# Patient Record
Sex: MALE | Race: WHITE
[De-identification: names, ages, dates, MRNs, and addresses within clinical notes are randomized per-mention and may not be internally consistent; named-entity substitution may affect disease eponyms.]

---

## 2019-03-14 LAB
ALBUMIN SERPL-MCNC: 3.5 G/DL (ref 3.4–5)
ALP SERPL-CCNC: 65 U/L (ref 46–116)
ALT SERPL-CCNC: 20 U/L (ref 30–65)
ANION GAP SERPL CALC-SCNC: 4 MMOL/L (ref 7–16)
AST SERPL-CCNC: 19 U/L (ref 15–37)
BILIRUB SERPL-MCNC: 0.4 MG/DL
BILIRUB UR-MCNC: NEGATIVE MG/DL
BUN SERPL-MCNC: 16 MG/DL (ref 7–18)
CALCIUM SERPL-MCNC: 8.9 MG/DL (ref 8.5–10.1)
CHLORIDE SERPL-SCNC: 107 MMOL/L (ref 98–107)
CO2 SERPL-SCNC: 31 MMOL/L (ref 21–32)
COLOR UR: YELLOW
CREAT SERPL-MCNC: 1.1 MG/DL (ref 0.6–1.3)
GLUCOSE SERPL-MCNC: 92 MG/DL (ref 70–99)
HCT VFR BLD CALC: 38.9 % (ref 42–52)
HGB BLD-MCNC: 13.1 GM/DL (ref 14–18)
INR PPP: 1.1
KETONES UR STRIP-MCNC: NEGATIVE MG/DL
MCH RBC QN AUTO: 30.9 PG (ref 26–34)
MCHC RBC AUTO-ENTMCNC: 33.8 G/DL (ref 28–37)
MCV RBC: 91.6 FL (ref 80–100)
MPV: 8.9 FL. (ref 7.2–11.1)
PLATELET COUNT*: 157 THOU/UL (ref 150–400)
POTASSIUM SERPL-SCNC: 4.2 MMOL/L (ref 3.5–5.1)
PROT SERPL-MCNC: 6.8 G/DL (ref 6.4–8.2)
PROT UR QL STRIP: NEGATIVE
PROTHROMBIN TIME: 10.8 SECONDS (ref 9.2–11.5)
RBC # BLD AUTO: 4.24 MIL/UL (ref 4.5–6)
RBC # UR STRIP: NEGATIVE /UL
RDW-CV: 14.3 % (ref 10.5–14.5)
SODIUM SERPL-SCNC: 142 MMOL/L (ref 136–145)
SP GR UR STRIP: 1.02 (ref 1–1.03)
URINE CLARITY: CLEAR
URINE GLUCOSE-RANDOM: NEGATIVE
URINE LEUKOCYTES-REFLEX: NEGATIVE
URINE NITRITE-REFLEX: NEGATIVE
UROBILINOGEN UR STRIP-ACNC: 0.2 E.U./DL (ref 0.2–1)
WBC # BLD AUTO: 4.6 THOU/UL (ref 4–11)

## 2019-03-14 NOTE — EKG
[place] [address]
Phone:  [phone]                     ELECTROCARDIOGRAM REPORT      
_______________________________________________________________________________
 
Name:       COLMENARESAUGUSTAANTELMO SHINE             Room:                      PRE IN  
[facility].#:  [id number]      Account #:      [id number]  
Admission:               Attend Phys:    PABLO Resendiz
Discharge:               Date of Birth:  [birth year]  
         Report #: [id number]
    [id number]
_______________________________________________________________________________
THIS REPORT FOR:  //name//                      
 
                          [facility]
                                       
Test Date:    2019               Test Time:    09:20:46
Pat Name:     AUGUSTA COLMENARES            Department:   
Patient ID:   [id number]            Room:          
Gender:       M                        Technician:   
:          1945               Requested By: Brooks Fuentes
Order Number: [id number]    Milo MD:   Catrachito Bello
                                 Measurements
Intervals                              Axis          
Rate:         79                       P:            -69
MS:           280                      QRS:          7
QRSD:         88                       T:            -16
QT:           402                                    
QTc:          461                                    
                           Interpretive Statements
Sinus or ectopic atrial rhythm
Prolonged MS interval
Low voltage, precordial leads
Anteroseptal infarct, old, possible
Borderline T abnormalities, inferior leads
Baseline wander in lead(s) V1
No previous ECG available for comparison
 
Electronically Signed On 3- 13:45:45 CDT by Catrachito Bello
[email or web address]
 
 
 
 
 
 
 
 
 
 
 
 
 
 
 
  <ELECTRONICALLY SIGNED>
                                           By: Catrachito Bello MD, FAC   
  19     1345
D: 19 0920   _____________________________________
T: 1920   Catrachito Bello MD, [facility]     /EPI

## 2019-03-26 ENCOUNTER — HOSPITAL ENCOUNTER (INPATIENT)
Dept: HOSPITAL 96 - M.TBA | Age: 74
LOS: 2 days | Discharge: HOME HEALTH | DRG: 470 | End: 2019-03-28
Attending: INTERNAL MEDICINE | Admitting: INTERNAL MEDICINE
Payer: MEDICARE

## 2019-03-26 VITALS — SYSTOLIC BLOOD PRESSURE: 113 MMHG | DIASTOLIC BLOOD PRESSURE: 73 MMHG

## 2019-03-26 VITALS — HEIGHT: 70 IN | BODY MASS INDEX: 31.5 KG/M2 | WEIGHT: 220 LBS

## 2019-03-26 DIAGNOSIS — I48.91: ICD-10-CM

## 2019-03-26 DIAGNOSIS — D62: ICD-10-CM

## 2019-03-26 DIAGNOSIS — Z88.8: ICD-10-CM

## 2019-03-26 DIAGNOSIS — I25.10: ICD-10-CM

## 2019-03-26 DIAGNOSIS — I10: ICD-10-CM

## 2019-03-26 DIAGNOSIS — M17.11: Primary | ICD-10-CM

## 2019-03-26 DIAGNOSIS — Z79.899: ICD-10-CM

## 2019-03-26 DIAGNOSIS — Z95.1: ICD-10-CM

## 2019-03-26 PROCEDURE — 0SRC0J9 REPLACEMENT OF RIGHT KNEE JOINT WITH SYNTHETIC SUBSTITUTE, CEMENTED, OPEN APPROACH: ICD-10-PCS | Performed by: ORTHOPAEDIC SURGERY

## 2019-03-26 NOTE — NUR
PT ARRIVED ON UNIT ABOUT 1530 FROM PACU. VITALS STABLE. DRESSING IS CLEAN DRY
AND INTACT. DID CPM FOR 2 HOURS -5 TO 75. POLARPACK, DRAIN, SCDs AND TEDs IN
PLACE. IV IN L HAND PATENT, INFUSING. ON CAPNO. DENIED PAIN. DENIED NAUSEA.
CALL LIGHT WITHIN REACH. FALL PRECAUTIONS IN PLACE.

## 2019-03-27 VITALS — SYSTOLIC BLOOD PRESSURE: 110 MMHG | DIASTOLIC BLOOD PRESSURE: 69 MMHG

## 2019-03-27 VITALS — DIASTOLIC BLOOD PRESSURE: 50 MMHG | SYSTOLIC BLOOD PRESSURE: 100 MMHG

## 2019-03-27 VITALS — SYSTOLIC BLOOD PRESSURE: 107 MMHG | DIASTOLIC BLOOD PRESSURE: 57 MMHG

## 2019-03-27 VITALS — SYSTOLIC BLOOD PRESSURE: 107 MMHG | DIASTOLIC BLOOD PRESSURE: 59 MMHG

## 2019-03-27 LAB
HCT VFR BLD CALC: 33.4 % (ref 42–52)
HGB BLD-MCNC: 11.2 GM/DL (ref 14–18)

## 2019-03-27 NOTE — NUR
ASSUMED CARE OF PATIENT AT APPROX 0730. ALERT AND ORIENTED X4. ASSESSMENT
COMPLETED AND AS CHARTED. VSS ON ROOM. NO COMPLAINTS OF SOA. PAIN AND NAUSEA
HAVE BEEN MANAGED WITH MEDICATIONS. HYDROCODOEN CAUSES NAUSEA, SO ONLY BEEN
GIVING OXY IR THIS AFTERNOON WITH NO FURTHER COMPLAINTS OF NAUSEA. PATIENT UP
TO CHAIR THIS MORNING, WORKED WELL WITH THERAPIES AND PROGRESSED TOWARD
GOALS. CPM IN USE AS ORDERD. FALL PRECAUTIONS IN PLACE. CALL LIGHT WITHIN
REACH. HOURLY ROUNDS COMPLETED. NURSING WILL CONTINUE TO MONITOR.

## 2019-03-27 NOTE — NUR
PT REMAINED A&O X 4. VITALS, SpO2 STABLE. DRESSING CLEAN, DRY, AND INTACT.
HEMO VAC IN PLACE WITH SANGUINEOUS DRAINAGE. PT VOIDED ADEQUATELY. PAIN
CONTROLLED BY MEDS. PT WELL TOLERATED CPM. NO NAUSEA OR VOMITING. IV
ANTIBIOTIC GIVEN AS ORDERED. WILL CONTINUE TO MONITOR.

## 2019-03-27 NOTE — NUR
RECIEVED O.T. ORDER.  WILL DEFER TO P.T. AT THIS TIME.  PLEASE ORDER FURTHER
O.T. SERVICES IF NEEDED.

## 2019-03-28 VITALS — DIASTOLIC BLOOD PRESSURE: 65 MMHG | SYSTOLIC BLOOD PRESSURE: 107 MMHG

## 2019-03-28 VITALS — SYSTOLIC BLOOD PRESSURE: 109 MMHG | DIASTOLIC BLOOD PRESSURE: 64 MMHG

## 2019-03-28 VITALS — SYSTOLIC BLOOD PRESSURE: 103 MMHG | DIASTOLIC BLOOD PRESSURE: 54 MMHG

## 2019-03-28 LAB
HCT VFR BLD CALC: 31.6 % (ref 42–52)
HGB BLD-MCNC: 10.7 GM/DL (ref 14–18)

## 2019-03-28 NOTE — NUR
PT REMAINED A&O X 4. LOW GRADE TEMPERATURE, INCENTIVE SPIROMETER ENCOURAGED
Q1H WHILE AWAKE. MEDS PROVIDED AS ORDERED FOR PAIN. PT REPORTED EFFECTIVE PAIN
MANAGEMENT. NO NAUSEA OR VOMITING. HOURLY ROUNDING COMPLETED. POLAR IN PLACE,
ROSETTE HOSE ON BILATERALLY. RT KNEE DRESSING CLEAN AND INTACT. PT TOLERATING
CPM. WILL CONTINUE TO MONITOR.

## 2019-03-28 NOTE — NUR
PT.SLEEPING. WIFE AT BEDSIDE AND ANSWERED ALL QUESTIONS. THEY ARE PLANNING ON
DISCHARGE TODAY AFTER AFTERNOON THERAPY. SHE SAID PT.HAS HOME WALKER,HERE IN
ROOM. DISCUSSED CPM AND POLAR MORGAN. PT.IS NORMALLY INDEPENDENT AT HOME. CHOSE
[facility]. CM MADE REFERRAL AND FAXED DISCHARGE SUMMARY AND MED
LIST TO MARY. WIFE CONFIRMED THAT PT.HAS [facility] 5MG AT HOME. IS ON IT
FOR A.FIB. WIFE WOULD LIKE PRESCRIPTIONS EARLY SO SHE CAN GET THEM FILLED
BEFORE SHE TAKES PT.HOME.

## 2019-03-28 NOTE — NUR
ASSUMED CARE OF PATIENT AT APPROX 0730. ALERT AND ORIENTED X4. ASSESSMENT
COMPLETED AND AS CHARTED. VSS ON ROOM AIR. NO COMPLAINTS OF NAUSEA OR SOA.
PAIN HAS BEEN MANAGED WITH ORAL MEDICATION. PATIENT WORKED WELL WITH
THERAPIES AND PROGRESSED TOWARD GOALS. PATIENT DISCHARGED HOME WITH HOME
HEALTH AT 1618 WITH ALL PERSONAL BELONGINGS, PRESCRIPTIONS AND DISCHARGE
INFORMATION.

## 2019-04-02 NOTE — OP
[facility] 
[address] [place]  [address]                    OPERATIVE REPORT              
_______________________________________________________________________________
 
Name:       AUGUSTA COLMENARES             Room:           [address] [place]  
M.R.#:  [id number]      Account #:      [id number]  
Admission:  03/26/19     Attend Phys:    PABLO Resendiz
Discharge:  03/28/19     Date of Birth:  [birth year]  
         Report #: [id number]
                                                                     [id number]  
_______________________________________________________________________________
THIS REPORT FOR:  //name//                      
 
CC: Brooks Shi
 
DATE OF SERVICE:  03/26/2019
 
 
PREOPERATIVE DIAGNOSIS:  Right knee osteoarthritis.
 
POSTOPERATIVE DIAGNOSIS:  Right knee osteoarthritis.
 
PROCEDURE:  Right total knee arthroplasty with Navio.
 
SURGEON:  Brooks Fuentes II, DO
 
FIRST ASSISTANT:  BUZZ Gutiérrez.
 
ANESTHESIA:  General endotracheal.
 
ESTIMATED BLOOD LOSS:  50 mL.
 
ANTIBIOTICS:  Per operative record.
 
DRAINS:  Medium Hemovac.
 
COMPLICATIONS:  None.
 
CONDITION:  The patient is stable to recovery room.
 
BRIEF HISTORY:  The patient was seen in the preoperative area.  Preoperative H
and P was performed.  Site was marked, questions were answered.  Risks and
benefits were discussed with the patient in detail about the surgery.  The
patient wished to proceed assuming all risks.
 
OPERATIVE PROCEDURE:  The patient was taken to the operative suite, placed
supine on the operative table and given appropriate anesthesia.  The patient's
right knee was sterilely prepped and draped.  Surgery began by inflation of the
tourniquet up to 300 mmHg for the duration of the procedure.  Incision was then
made over the anterior aspect of the knee and carried down to subcutaneous
tissues.  A medial parapatellar arthrotomy was performed and carried down to
bone.  The patella was then everted and excess osteophytes and soft tissues were
removed from around the femur and tibia.  The [employer] robotic guide pin was then
placed in the tibia and femur in appropriate fashion.  The knee was then
registered through the [employer] software and the robot was activated, this
 
 
 
[place] [address]                    OPERATIVE REPORT              
_______________________________________________________________________________
 
Name:       AUGUSTA COLMENARES             Room:           [address]#:  [id number]      Account #:      [id number]  
Admission:  03/26/19     Attend Phys:    PABLO Resendiz
Discharge:  03/28/19     Date of Birth:  [birth year]  
         Report #: [id number]
                                                                     [id number]  
_______________________________________________________________________________
handpiece was utilized to create the cutting holes for the cutting blocks.  The
femoral cutting block was then applied, appropriate cut was made to the distal
femur and bone was removed.  The 4-in-1 cutting block was then applied, aligned
with the [employer] robotic software and pinned in appropriate position and
appropriate cuts were made.  Attention was then turned to the tibia.  Retractors
were placed along the collateral ligaments to protect these as well as in the
posterior aspect.  The tibial cutting block was then applied, checked with a
drop kemi for rotational alignment and slope as well as with robotic assistance. 
Appropriate cut was made.  Tibial bone was then removed.  Excess meniscus and
soft tissues were removed from around the joint capsule and cautery was utilized
for hemostasis.  The tibial tray was then applied and checked for rotational
alignment with the drop kemi, pinned in appropriate position.  Reamer was then
applied and box cut was reamed.  This was then trialed with the appropriate
spacer, which showed excellent fit and fill of the knee and excellent stability
of the knee throughout all range of motion.  Patella was then reamed in
appropriate fashion and sized to appropriate size.  Three peg holes were drilled
and it was trialed and showed excellent flexion and extension, excellent
tracking of the patella within the groove.  These trials were then removed.  The
tibia was punched in appropriate fashion.  Bone ends were cleaned with Pulsavac
irrigation and cement was mixed and applied to the final implants.  These were
malleted into position and held the knee in extension and compressed to allow
the cement to cure.  After time had passed and cement had cured, excess was
removed utilizing a [place] and osteotome.  The final spacer was then trialed and
selected and the final spacer was malleted into position.  Knee was once again
reduced and showed excellent flexion and extension, excellent tracking of the
patella within the groove and excellent stability on medial and lateral testing.
 Tourniquet was deflated.  Hemostasis was obtained with electrocautery.  Copious
irrigation was performed of the knee.  The medium Hemovac drain was applied. 
The capsule was then closed with 2 FiberWire and 1 Vicryl in figure-of-eight
fashion.  Skin was closed with 2-0 Vicryl and a running Monocryl stitch.  The
incision sites for the tracker pins were closed with a nylon stitch.  Dermabond
and sterile dressing was applied as well as an Ace wrap and PolarCare.  The
patient was transported to the recovery room in stable condition.  Counts
correct throughout the procedure.
 
 
 
 
 
 
 
 
 
 
<ELECTRONICALLY SIGNED>
                                        By:  Brooks Fuentes II, DO     
04/02/19     0836
D: 03/27/19 0758_______________________________________
T: 03/27/19 0925Brooks Fuentes II, DO        /nt

## 2021-09-10 NOTE — NUR
[facility] Consult Note      Reason for Admission: Unilateral primary osteoarthritis, left hip [M16.12]  Status post total hip replacement, left [Z96.642]      Reason for consultation: medical management    Requesting physician: Dr Jose Blandon MD       HPI: Kb Trujillo Jr. is a 70 year old male w/PMH (per pt and chart review) including HTN, HLD, BPH, PUD, depression who presents for scheduled Procedure(s) and Anesthesia Type:     * LEFT TOTAL HIP ARTHROPLASTY DIRECT ANTERIOR APPROACH - Epidural on No surgery found  by Jose Blandon MD  Phone Number: [phone] on 9/10/2021  5:27 AM. No complications intra-op or post-op * No complications entered in OR log *. Pt doing well, pain controlled with current pain regimen. Denies HA, vision changes, weakness, numbness, difficulty swallowing or speaking.  Denies CP, palp, SOB, cough, LE edema.  Denies f/c/n/v/d/c.  Denies dysuria, polyuria, abd pain.       Review of Systems:  All other systems reviewed and are negative.    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 12 capsule 3   • pravastatin (PRAVACHOL) 40 MG tablet TAKE 1 TABLET BY MOUTH EVERY DAY 90 tablet 3   • Omega 3 1000 MG capsule Take 1,000 mg by mouth daily.     • Multiple Vitamins-Minerals (MULTIVITAMIN ADULT PO)      • TURMERIC PO Take 500 mg by mouth daily.     • Coenzyme Q10 (CO Q10 PO) Take 1 tablet by mouth daily.     • irbesartan (AVAPRO) 150 MG tablet TAKE 1 TABLET BY MOUTH EVERY DAY 90  HEMOVAC DRAIN DISCONTINUED PER ORDER. tablet 11     Current Facility-Administered Medications   Medication Dose Route Frequency Provider Last Rate Last Admin   • bupivacaine liposome (EXPAREL) 1.3 % injection 266 mg  20 mL Infiltration Once Jose Blandon MD       • lactated ringers infusion  10 mL/hr Intravenous Continuous Aladino de Ranieri, MD       • sodium chloride 0.9 % flush bag 25 mL  25 mL Intravenous PRN Katia Javier PA-C       • sodium chloride (PF) 0.9 % injection 2 mL  2 mL Intracatheter 2 times per day Katia Javier PA-C       • rivaroxaban (XARELTO) tablet 10 mg  10 mg Oral Daily Katia Javier PA-C       • acetaminophen (TYLENOL) tablet 1,000 mg  1,000 mg Oral 3 times per day Katia Javier PA-C       • celecoxib (CeleBREX) capsule 200 mg  200 mg Oral 2 times per day Katia Javier PA-C       • gabapentin (NEURONTIN) capsule 200 mg  200 mg Oral 3 times per day Katia Javier PA-C       • oxyCODONE (IMM REL) (ROXICODONE) tablet 5 mg  5 mg Oral Q4H PRN Katia Javier PA-C        Or   • oxyCODONE (IMM REL) (ROXICODONE) tablet 10 mg  10 mg Oral Q4H PRN Katia Javier PA-C       • ceFAZolin (ANCEF) syringe 2,000 mg  2,000 mg Intravenous 3 times per day Katia Javier PA-C       • ondansetron (ZOFRAN ODT) disintegrating tablet 4 mg  4 mg Oral Q12H PRN Katia Javier PA-C        Or   • ondansetron (ZOFRAN) injection 4 mg  4 mg Intravenous Q12H PRN Katia Javier PA-C       • famotidine (PEPCID) tablet 20 mg  20 mg Oral Daily Katia Javier PA-C       • docusate sodium-sennosides (SENOKOT S) 50-8.6 MG 2 tablet  2 tablet Oral BID PRN Katia Javier PA-C       • calcium carbonate (TUMS) chewable tablet 1,000 mg  1,000 mg Oral Q4H PRN Katia Javier PA-C       • diphenhydrAMINE (BENADRYL) capsule 25 mg  25 mg Oral Q4H PRN Katia Javier PA-C       • sodium chloride 0.9% infusion   Intravenous Continuous Katia HANKINS  SIRENA Javier           ALLERGIES:   Allergen Reactions   • Gold RASH       Social History:  He reports that he has never smoked. He has never used smokeless tobacco. He reports previous alcohol use. He reports that he does not use drugs. He reports being sexually active and has had partner(s) who are female.    Family History:  Reviewed, not pertinent to current hospitalization    Objective:  Visit Vitals  BP (!) 143/81   Pulse 72   Temp 97.3 °F (36.3 °C) (Temporal)   Resp 16   Ht 5' 10.08\" (1.78 m)   Wt 98.6 kg (217 lb 6 oz)   SpO2 97%   BMI 31.12 kg/m²       Gen: NAD  Head: NCNT  Eyes: no scleral icterus, EOMI, no ptosis  Mouth: oropharynx clear w/o erythema/lesions, MMM  Neck: supple without masses, trachea midline   CV: RRR, no mrg  Resp: CTAB, no wheezes/rales/rhonchi, not using accessory mm, nl resp effort  GI: soft, NTND, +BS, no rebound/guarding  Ext: no clubbing, cyanosis, edema  Skin: warm, no rashes or lesions  Neuro: CN II-XII grossly intact, speech fluent  Psych: alert, appropriate affect    Diagnostic data:  No results found    Invalid input(s): NEPRELIM    No results found    Invalid input(s):  ALBUMIN,  BILIRUBIN, DIRECTBILIRUBIN, )    No results found    No results found     Cardiac Enzymes  Invalid input(s): TROPONINT  No results found    Microbiology Results     None             Studies:  FL GUIDANCE WITH REPORT   Final Result   Fluoroscopy provided by the radiology department in the   operating room.  Total fluoroscopic time of 82nd.  A radiologist was not   present.  Please see operative report for full details.      Electronically Signed by: SARAH WALLACE D.O.    Signed on: 9/10/2021 12:22 PM          XR HIP 1 VIEW LEFT   Final Result   FINDINGS AND IMPRESSION:       Multiple intraoperative photospots obtained during a total left hip   arthroplasty.  Prosthetic components in place.  Alignment maintained.    Please correlate with surgical procedure for complete description and    interpretation of findings.         Electronically Signed by: CARLEY CARRANZA M.D.    Signed on: 9/10/2021 1:10 PM          XR PELVIS 1 VIEW   Final Result   FINDINGS AND IMPRESSION:       The patient has undergone total left hip arthroplasty.  Prosthetic   components in place.  Alignment maintained.  1.7 cm bony fragment lateral   to the proximal left femur.  No fracture line.  Moderate to advanced   degenerative changes right hip joint.  Postsurgical subcutaneous emphysema.         Electronically Signed by: CARLEY CARRANZA M.D.    Signed on: 9/10/2021 11:49 AM          XR PELVIS 1 VIEW    (Results Pending)          ASSESSMENT/PLAN:  70 year old male w/PMH (per pt and chart review) including HTN, HLD, BPH, PUD, depression who presents for scheduled Procedure(s) and Anesthesia Type:     * LEFT TOTAL HIP ARTHROPLASTY DIRECT ANTERIOR APPROACH - Epidural on No surgery found  by Jose Blandon MD  Phone Number: [phone] on 9/10/2021    s/p L KIARRA 9/10  -comanagement with surgery  -post-op pain control: as listed above  -periop abx as determined by primary service  -IS, bowel regimen  -PT/OTas needed  -rec pepcid to be continued on DC while pt on blood thinners bc he has h/o PUD    HTN, HLD  -resume BP med w parameters  -resume statin on DC    depression   -prozac    Carcinoid tumor, intestinal  -no acute issues     ??  Prophylaxis:  DVT:   Current Active Medications for DVT Prophylaxis (From admission, onward)         Stop     rivaroxaban (XARELTO) tablet 10 mg  10 mg,   Oral,   DAILY         --                Dispo:  -per primary service   -PCP: Deborah Mcgovern MD    Consultants:   IP Consult Orders (From admission, onward)             Start     Ordered    09/10/21 1323  Inpatient consult to Hospitalist  ONE TIME        Provider:  Elodia Almonte MD    09/10/21 1323                Code:   Code Status Information     Code Status    Full Resuscitation           Greater than 50% of the time spent reviewing the patient  records, coordinating patient care plan and discussing the above care plan with the patient.  Elodia Almonte MD